# Patient Record
Sex: FEMALE
[De-identification: names, ages, dates, MRNs, and addresses within clinical notes are randomized per-mention and may not be internally consistent; named-entity substitution may affect disease eponyms.]

---

## 2023-02-22 PROBLEM — Z00.00 ENCOUNTER FOR PREVENTIVE HEALTH EXAMINATION: Status: ACTIVE | Noted: 2023-02-22

## 2023-02-23 ENCOUNTER — NON-APPOINTMENT (OUTPATIENT)
Age: 78
End: 2023-02-23

## 2023-02-28 ENCOUNTER — APPOINTMENT (OUTPATIENT)
Dept: PULMONOLOGY | Facility: CLINIC | Age: 78
End: 2023-02-28
Payer: MEDICARE

## 2023-02-28 VITALS
HEART RATE: 79 BPM | OXYGEN SATURATION: 97 % | WEIGHT: 165 LBS | SYSTOLIC BLOOD PRESSURE: 112 MMHG | BODY MASS INDEX: 26.52 KG/M2 | TEMPERATURE: 98.1 F | HEIGHT: 66 IN | DIASTOLIC BLOOD PRESSURE: 72 MMHG

## 2023-02-28 DIAGNOSIS — K21.9 GASTRO-ESOPHAGEAL REFLUX DISEASE W/OUT ESOPHAGITIS: ICD-10-CM

## 2023-02-28 PROCEDURE — 99204 OFFICE O/P NEW MOD 45 MIN: CPT

## 2023-03-02 PROBLEM — K21.9 GERD (GASTROESOPHAGEAL REFLUX DISEASE): Status: ACTIVE | Noted: 2023-03-02

## 2023-03-02 NOTE — HISTORY OF PRESENT ILLNESS
[TextBox_4] : non smoker with ground glass\par \par feels well,\par \par dexilent\par \par reflux  \par \par pepcid\par \par would vomit if eating too close to bed time\par \par severe gerd\par \par

## 2023-03-02 NOTE — DISCUSSION/SUMMARY
[FreeTextEntry1] : discussed the ggo with her in depth\par \par also the reflux,  so bad that she cannot eat at night without vomiting\par \par double dose of ppi which will not help the reflux just decrease the symptoms\par \par suggest surgical consultsation\par \par six month fu for ggo

## 2023-03-07 ENCOUNTER — NON-APPOINTMENT (OUTPATIENT)
Age: 78
End: 2023-03-07

## 2023-03-16 ENCOUNTER — NON-APPOINTMENT (OUTPATIENT)
Age: 78
End: 2023-03-16

## 2023-03-16 ENCOUNTER — APPOINTMENT (OUTPATIENT)
Dept: THORACIC SURGERY | Facility: CLINIC | Age: 78
End: 2023-03-16
Payer: MEDICARE

## 2023-03-16 VITALS
TEMPERATURE: 97.1 F | OXYGEN SATURATION: 96 % | HEART RATE: 68 BPM | BODY MASS INDEX: 26.52 KG/M2 | HEIGHT: 66 IN | RESPIRATION RATE: 17 BRPM | DIASTOLIC BLOOD PRESSURE: 80 MMHG | SYSTOLIC BLOOD PRESSURE: 180 MMHG | WEIGHT: 165 LBS

## 2023-03-16 DIAGNOSIS — K21.9 DIAPHRAGMATIC HERNIA W/OUT OBSTRUCTION OR GANGRENE: ICD-10-CM

## 2023-03-16 DIAGNOSIS — K44.9 DIAPHRAGMATIC HERNIA W/OUT OBSTRUCTION OR GANGRENE: ICD-10-CM

## 2023-03-16 DIAGNOSIS — R91.8 OTHER NONSPECIFIC ABNORMAL FINDING OF LUNG FIELD: ICD-10-CM

## 2023-03-16 PROCEDURE — 99203 OFFICE O/P NEW LOW 30 MIN: CPT

## 2023-03-17 PROBLEM — K44.9 HIATAL HERNIA WITH GERD: Status: ACTIVE | Noted: 2023-03-02

## 2023-03-17 PROBLEM — R91.8 PULMONARY NODULES: Status: ACTIVE | Noted: 2023-03-17

## 2023-03-17 NOTE — HISTORY OF PRESENT ILLNESS
[FreeTextEntry1] : 78 y/o female, nonsmoker with a PMH of GERD (Dexilant) hiatal hernia, esophagitis, lung nodules, hepatic lesion. She presents for surgical of her hiatal hernia. She is referred by Dr. Galvan. Studies as below:\par \par CT angio cardiac completed on 02/01/23:\par -multiple solid and subsolid pulmonary nodules measuring up to 9mm \par -partially visualized hypoattenuating right hepatic segment 8 lesion measuring 1.9cm. \par -large hiatal hernia\par \par Abdominal US completed on 02/22/23:\par -echogenic liver parenchyma, which can be seen with hepatic steatosis or chronic liver disease\par -two small cysts versus one cyst are present in the posterior right lobe measuring 2.1 x 2.1 x 2.5cm\par -simple cyst is present in the left kidney measuring 1cm\par \par EGD completed on 12/29/22:\par -Grade B reflux esophagitis with no bleeding\par -small hiatal hernia\par -normal stomach\par -normal examined duodenum \par \par

## 2023-03-17 NOTE — PHYSICAL EXAM
[General Appearance - Alert] : alert [General Appearance - In No Acute Distress] : in no acute distress [] : no respiratory distress [Auscultation Breath Sounds / Voice Sounds] : lungs were clear to auscultation bilaterally [Heart Rate And Rhythm] : heart rate was normal and rhythm regular [Heart Sounds] : normal S1 and S2 [Heart Sounds Gallop] : no gallops [Murmurs] : no murmurs [Heart Sounds Pericardial Friction Rub] : no pericardial rub [Examination Of The Chest] : the chest was normal in appearance [Chest Visual Inspection Thoracic Asymmetry] : no chest asymmetry [Diminished Respiratory Excursion] : normal chest expansion [External Female Genitalia] : normal external genitalia [Urethral Meatus] : normal urethra [Urinary Bladder Findings] : the bladder was normal on palpation [Abnormal Walk] : normal gait [Nail Clubbing] : no clubbing  or cyanosis of the fingernails [Musculoskeletal - Swelling] : no joint swelling seen [Motor Tone] : muscle strength and tone were normal [Deep Tendon Reflexes (DTR)] : deep tendon reflexes were 2+ and symmetric [Sensation] : the sensory exam was normal to light touch and pinprick [No Focal Deficits] : no focal deficits [Oriented To Time, Place, And Person] : oriented to person, place, and time [Impaired Insight] : insight and judgment were intact [Affect] : the affect was normal